# Patient Record
Sex: MALE | Race: WHITE
[De-identification: names, ages, dates, MRNs, and addresses within clinical notes are randomized per-mention and may not be internally consistent; named-entity substitution may affect disease eponyms.]

---

## 2018-02-15 ENCOUNTER — HOSPITAL ENCOUNTER (EMERGENCY)
Dept: HOSPITAL 17 - NEPE | Age: 73
LOS: 1 days | Discharge: HOME | End: 2018-02-16
Payer: MEDICARE

## 2018-02-15 VITALS
OXYGEN SATURATION: 97 % | DIASTOLIC BLOOD PRESSURE: 77 MMHG | SYSTOLIC BLOOD PRESSURE: 145 MMHG | TEMPERATURE: 97.9 F | RESPIRATION RATE: 18 BRPM | HEART RATE: 82 BPM

## 2018-02-15 VITALS — HEIGHT: 69 IN | BODY MASS INDEX: 28.08 KG/M2 | WEIGHT: 189.6 LBS

## 2018-02-15 DIAGNOSIS — I10: ICD-10-CM

## 2018-02-15 DIAGNOSIS — F17.210: ICD-10-CM

## 2018-02-15 DIAGNOSIS — E11.9: ICD-10-CM

## 2018-02-15 DIAGNOSIS — S39.012A: ICD-10-CM

## 2018-02-15 DIAGNOSIS — S29.011A: Primary | ICD-10-CM

## 2018-02-15 DIAGNOSIS — Z79.82: ICD-10-CM

## 2018-02-15 DIAGNOSIS — Z79.84: ICD-10-CM

## 2018-02-15 DIAGNOSIS — R91.8: ICD-10-CM
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
## 2018-02-15 PROCEDURE — 99284 EMERGENCY DEPT VISIT MOD MDM: CPT

## 2018-02-15 PROCEDURE — 96372 THER/PROPH/DIAG INJ SC/IM: CPT

## 2018-02-15 PROCEDURE — 72100 X-RAY EXAM L-S SPINE 2/3 VWS: CPT

## 2018-02-15 PROCEDURE — 71046 X-RAY EXAM CHEST 2 VIEWS: CPT

## 2018-02-15 PROCEDURE — 72170 X-RAY EXAM OF PELVIS: CPT

## 2018-02-16 NOTE — RADRPT
EXAM DATE/TIME:  02/16/2018 00:49 

 

[facility] COMPARISON:     

No previous studies available for comparison.

 

                     

INDICATIONS :     

Coughing and midsternal chest pain x 1 week. 

                     

 

MEDICAL HISTORY :     

Hypertension.  Diabetes mellitus type II.        

 

SURGICAL HISTORY :     

Cholecystectomy.   

 

ENCOUNTER:     

Initial                                        

 

ACUITY:     

1 week      

 

PAIN SCORE:     

6/10

 

LOCATION:     

Bilateral chest 

 

FINDINGS:     

Approximate 6 cm mass is present in the left upper lobe posteriorly near the major fissure highly mattie
picious for malignancy.

 

CONCLUSION:     

Left upper lobe mass highly suspicious for malignancy.

 

 

 

 PAUL Trejo MD on February 16, 2018 at 0:57           

Board Certified Radiologist.

 This report was verified electronically.

## 2018-02-16 NOTE — RADRPT
EXAM DATE/TIME:  02/16/2018 01:30 

 

[facility] COMPARISON:     

No previous studies available for comparison.

 

                     

INDICATIONS :     

Lower back pain. Rule out mets.

                     

 

MEDICAL HISTORY :     

Hypercholesterolemia.  Hypertension  Diabetes mellitus type II.      

 

SURGICAL HISTORY :     

Cholecystectomy.   

 

ENCOUNTER:     

Initial                                        

 

ACUITY:     

1 week      

 

PAIN SCORE:     

6/10

 

LOCATION:       

pelvis, posterior

 

FINDINGS:     

A No definite fractures, or dislocations are identified.  No definite lytic or sclerotic lesion is se
en.  The joint spaces are well maintained.

 

CONCLUSION:          

Unremarkable study.

 

 

 

 PAUL Trejo MD on February 16, 2018 at 1:47           

Board Certified Radiologist.

 This report was verified electronically.

## 2018-02-16 NOTE — PD
HPI


Chief Complaint:  Back/ Neck Pain or Injury


Time Seen by Provider:  00:37


Travel History


International Travel<30 days:  No


Contact w/Intl Traveler<30days:  No


Traveled to known affect area:  No





History of Present Illness


HPI


Patient gives a history of over the past 3 weeks increased pain getting over 

the flu.  Stated that he did not take any Tamiflu or was not treated he simply 

was taking TheraFlu and has gradually improved.  However over the last few days 

he has now 











No known drug allergies


Past medical history significant for hypertension diabetes smoker


Past surgical history significant for cholecystectomy





Critical access hospital


Past Medical History


Hx Anticoagulant Therapy:  Yes (aspirin)


Cardiovascular Problems:  Yes (HTN)


Diabetes:  Yes


Patient Takes Glucophage:  Yes (Metformin)


Hypertension:  Yes


Tetanus Vaccination:  Unknown


Influenza Vaccination:  No





Past Surgical History


Cholecystectomy:  Yes





Social History


Alcohol Use:  No


Tobacco Use:  No (2PPD )


Substance Use:  No





Allergies-Medications


(Allergen,Severity, Reaction):  


Coded Allergies:  


     No Known Drug Allergies (Verified  Allergy, Unknown, 2/16/18)





Review of Systems


Except as stated in HPI:  all other systems reviewed are Neg


Respiratory:  Positive: Cough





Physical Exam


Narrative


GENERAL: 


SKIN: Warm and dry.


HEAD: Atraumatic. Normocephalic. 


EYES: Pupils equal and round. No scleral icterus. No injection or drainage. 


ENT: No nasal bleeding or discharge.  Mucous membranes pink and moist.


NECK: Trachea midline. No JVD. 


CARDIOVASCULAR: Regular rate and rhythm.  


RESPIRATORY: No accessory muscle use. Clear to auscultation. Breath sounds 

equal bilaterally. 


GASTROINTESTINAL: Abdomen soft, non-tender, nondistended. 


MUSCULOSKELETAL: Extremities without clubbing, cyanosis, or edema. No obvious 

deformities. 


NEUROLOGICAL: Awake and alert. No obvious cranial nerve deficits.  Motor 

grossly within normal limits. Five out of 5 muscle strength in the arms and 

legs.  Normal speech.


PSYCHIATRIC: Appropriate mood and affect; insight and judgment normal.





Data


Data


Last Documented VS





Vital Signs








  Date Time  Temp Pulse Resp B/P (MAP) Pulse Ox O2 Delivery O2 Flow Rate FiO2


 


2/15/18 23:50 97.9 82 18 145/77 (99) 97 Room Air  








Orders





 Orders


Chest, Pa & Lat (2/16/18 00:37)


Ketorolac Inj (Toradol Inj) (2/16/18 00:45)


Orphenadrine Inj (Norflex Inj) (2/16/18 00:45)


Spine, Lumbar - Ltd (Ap & Lat) (2/16/18 )


Pelvis, Ap Only (Routine) (2/16/18 )








MDM


Medical Decision Making


Medical Screen Exam Complete:  Yes


Emergency Medical Condition:  Yes


Medical Record Reviewed:  Yes


Differential Diagnosis


Chest wall strain versus lumbar strain versus pneumonia versus mass


Narrative Course


Patient's chest x-ray did not show any evidence of any pneumonia however it did 

show a left upper lobe mass which is highly suspicious for malignancy according 

to the radiologist.  Patient's primary care doctor is out of [place] Dr. Dow





Diagnosis





 Primary Impression:  


 CHEST WALL STRAIN


 Additional Impressions:  


 Lumbar strain


 Qualified Codes:  S39.012A - Strain of muscle, fascia and tendon of lower back

, initial encounter


 Left upper lung mass


Patient Instructions:  General Instructions





***Additional Instructions:  


please get in touch with your primary care doctor to have further evaluation of 

this lung mass that was found as it may be cancer.


Scripts


Codeine-Acetaminophen (Codeine-Acetaminophen)  mg Tab


1 TAB PO Q4H Y for PAIN, #15 TAB 0 Refills


   Prov: Miguel Bowie MD         2/16/18











Miguel Bowie MD Feb 16, 2018 00:41

## 2021-02-24 NOTE — RADRPT
Detail Level: Detailed EXAM DATE/TIME:  02/16/2018 01:31 

 

[facility] COMPARISON:     

No previous studies available for comparison.

 

                     

INDICATIONS :     

Back pain. Rule out mets.

                     

 

MEDICAL HISTORY :     

Hypercholesterolemia.       Hypertension. Diabetes mellitus type II.   

 

SURGICAL HISTORY :        

Cholecystectomy.

 

ENCOUNTER:     

Initial                                        

 

ACUITY:     

1 week      

 

PAIN SCORE:     

6/10

LOCATION:       lumbar

 

FINDINGS:     

No appreciable compression deformities, spondylolisthesis, or spondylolysis is seen.  Significant deg
enerative changes are seen within the disc space and facets with fusion osteophytes floating anterior
ly. Chronic atherosclerotic calcifications are seen without any definite aneurysmal dilatations for t
echnique. There is evidence for prior cholecystectomy.

 

CONCLUSION:            Chronic changes.

 

 

 

 PAUL Trejo MD on February 16, 2018 at 1:47           

Board Certified Radiologist.

 This report was verified electronically. Add 31511 Cpt? (Important Note: In 2017 The Use Of 34939 Is Being Tracked By Cms To Determine Future Global Period Reimbursement For Global Periods): yes